# Patient Record
Sex: FEMALE | Race: WHITE | NOT HISPANIC OR LATINO | Employment: OTHER | RURAL
[De-identification: names, ages, dates, MRNs, and addresses within clinical notes are randomized per-mention and may not be internally consistent; named-entity substitution may affect disease eponyms.]

---

## 2020-11-16 ENCOUNTER — HISTORICAL (OUTPATIENT)
Dept: ADMINISTRATIVE | Facility: HOSPITAL | Age: 45
End: 2020-11-16

## 2020-11-17 LAB — SARS-COV+SARS-COV-2 AG RESP QL IA.RAPID: NEGATIVE

## 2021-12-13 DIAGNOSIS — M25.511 RIGHT SHOULDER PAIN: Primary | ICD-10-CM

## 2021-12-13 DIAGNOSIS — M25.561 RIGHT KNEE PAIN: ICD-10-CM

## 2022-01-05 ENCOUNTER — HOSPITAL ENCOUNTER (OUTPATIENT)
Dept: RADIOLOGY | Facility: HOSPITAL | Age: 47
Discharge: HOME | End: 2022-01-05
Attending: ORTHOPAEDIC SURGERY
Payer: MEDICARE

## 2022-01-05 ENCOUNTER — OFFICE VISIT (OUTPATIENT)
Dept: ORTHOPEDICS | Facility: CLINIC | Age: 47
End: 2022-01-05
Payer: MEDICARE

## 2022-01-05 VITALS — WEIGHT: 293 LBS | HEIGHT: 68 IN | BODY MASS INDEX: 44.41 KG/M2

## 2022-01-05 DIAGNOSIS — M25.511 ACUTE PAIN OF RIGHT SHOULDER: ICD-10-CM

## 2022-01-05 DIAGNOSIS — S42.254A CLOSED NONDISPLACED FRACTURE OF GREATER TUBEROSITY OF RIGHT HUMERUS, INITIAL ENCOUNTER: ICD-10-CM

## 2022-01-05 DIAGNOSIS — M25.511 RIGHT SHOULDER PAIN: ICD-10-CM

## 2022-01-05 DIAGNOSIS — M22.2X1 PATELLOFEMORAL PAIN SYNDROME OF RIGHT KNEE: Primary | ICD-10-CM

## 2022-01-05 DIAGNOSIS — M25.561 RIGHT KNEE PAIN: ICD-10-CM

## 2022-01-05 PROCEDURE — 99213 OFFICE O/P EST LOW 20 MIN: CPT | Mod: 25,,, | Performed by: NURSE PRACTITIONER

## 2022-01-05 PROCEDURE — 20610 DRAIN/INJ JOINT/BURSA W/O US: CPT | Mod: RT,,, | Performed by: NURSE PRACTITIONER

## 2022-01-05 PROCEDURE — 73030 X-RAY EXAM OF SHOULDER: CPT | Mod: 26,RT,, | Performed by: ORTHOPAEDIC SURGERY

## 2022-01-05 PROCEDURE — 99213 PR OFFICE/OUTPT VISIT, EST, LEVL III, 20-29 MIN: ICD-10-PCS | Mod: 25,,, | Performed by: NURSE PRACTITIONER

## 2022-01-05 PROCEDURE — 73564 X-RAY EXAM KNEE 4 OR MORE: CPT | Mod: 26,RT,, | Performed by: ORTHOPAEDIC SURGERY

## 2022-01-05 PROCEDURE — 73030 X-RAY EXAM OF SHOULDER: CPT | Mod: TC,RT

## 2022-01-05 PROCEDURE — 73030 XR SHOULDER COMPLETE 2 OR MORE VIEWS RIGHT: ICD-10-PCS | Mod: 26,RT,, | Performed by: ORTHOPAEDIC SURGERY

## 2022-01-05 PROCEDURE — 73564 X-RAY EXAM KNEE 4 OR MORE: CPT | Mod: TC,RT

## 2022-01-05 PROCEDURE — 20610 LARGE JOINT ASPIRATION/INJECTION: R PATELLAR BURSA: ICD-10-PCS | Mod: RT,,, | Performed by: NURSE PRACTITIONER

## 2022-01-05 PROCEDURE — 73564 XR KNEE COMP 4 OR MORE VIEWS RIGHT: ICD-10-PCS | Mod: 26,RT,, | Performed by: ORTHOPAEDIC SURGERY

## 2022-01-05 RX ORDER — MONTELUKAST SODIUM 10 MG/1
TABLET ORAL
COMMUNITY
Start: 2021-11-29

## 2022-01-05 RX ORDER — METOPROLOL SUCCINATE 25 MG/1
TABLET, EXTENDED RELEASE ORAL
COMMUNITY
Start: 2021-12-29

## 2022-01-05 RX ORDER — ARIPIPRAZOLE 20 MG/1
TABLET ORAL
COMMUNITY

## 2022-01-05 RX ORDER — TRAZODONE HYDROCHLORIDE 150 MG/1
TABLET ORAL
COMMUNITY

## 2022-01-05 RX ORDER — TRIAMCINOLONE ACETONIDE 40 MG/ML
40 INJECTION, SUSPENSION INTRA-ARTICULAR; INTRAMUSCULAR
Status: DISCONTINUED | OUTPATIENT
Start: 2022-01-05 | End: 2022-01-05 | Stop reason: HOSPADM

## 2022-01-05 RX ORDER — ERENUMAB-AOOE 70 MG/ML
INJECTION SUBCUTANEOUS
COMMUNITY
Start: 2021-11-29

## 2022-01-05 RX ORDER — TIZANIDINE 4 MG/1
TABLET ORAL
COMMUNITY

## 2022-01-05 RX ORDER — OXYCODONE AND ACETAMINOPHEN 10; 325 MG/1; MG/1
TABLET ORAL
COMMUNITY
Start: 2021-12-28

## 2022-01-05 RX ORDER — GABAPENTIN 300 MG/1
CAPSULE ORAL
COMMUNITY

## 2022-01-05 RX ORDER — PAROXETINE HYDROCHLORIDE 40 MG/1
TABLET, FILM COATED ORAL
COMMUNITY
Start: 2021-11-17

## 2022-01-05 RX ADMIN — TRIAMCINOLONE ACETONIDE 40 MG: 40 INJECTION, SUSPENSION INTRA-ARTICULAR; INTRAMUSCULAR at 10:01

## 2022-01-05 NOTE — PROGRESS NOTES
ASSESSMENT:      ICD-10-CM ICD-9-CM   1. Patellofemoral pain syndrome of right knee  M22.2X1 719.46   2. Acute pain of right shoulder  M25.511 719.41       PLAN:     -Findings and treatment options were discussed with the patient  -All questions answered  Natural history and expected course discussed. Questions answered.  Educational material distributed.  Reduction in offending activity.  OTC analgesics as needed.  Physical therapy referral.  Injection right knee  RTC 6 weeks    Patient Instructions   Will set up formal PT for right shoulder- PROM and AROM, and right knee  RTC 6 weeks      IMAGING:  X-ray Shoulder 2 or More Views Right  Healing nondisplaced greater tuberosity fracture    X-Ray Knee Complete 4 Or More Views Right    No acute fracture or dislocation          CC: Shoulder pain   46 y.o. Female who presents as a new patient to me for evaluation of right shoulder pain.   Pt fell into a chest injuring her shoulder a month ago, states she fell again on   causing more pain to her shoulder. Pt states she is also having knee pain, her knee feels weak at times like it wants to give way. Most of her pain is under her kneecap. She was seen at an outside clinic and told she has a proximal humerus fx.    Occupation: Disable  Pain has been present for several weeks.  Description of Injury:   she has not had formal physical therapy  she has not had previous shoulder injections.   she has not had advanced imaging such as MRI.   The shoulder pain worsens with activity and overhead motion. Pain is disruptive to sleep at night. The pain is better with rest. Treatment thus far has included rest and activity modification. Here today to discuss diagnosis and treatment options.   VAS Pain Scale:  7  SANE Score: 55         PAST MEDICAL HISTORY:   No past medical history on file.  PAST SURGICAL HISTORY:  Past Surgical History:   Procedure Laterality Date    BACK SURGERY       SECTION      GASTRIC  BYPASS      TONSILLECTOMY      TUBAL LIGATION       MEDICATIONS:    Current Outpatient Medications:     AIMOVIG AUTOINJECTOR 70 mg/mL autoinjector, , Disp: , Rfl:     ARIPiprazole (ABILIFY) 20 MG Tab, 1 tablet, Disp: , Rfl:     gabapentin (NEURONTIN) 300 MG capsule, 1 capsule at bedtime, Disp: , Rfl:     metoprolol succinate (TOPROL-XL) 25 MG 24 hr tablet, , Disp: , Rfl:     montelukast (SINGULAIR) 10 mg tablet, , Disp: , Rfl:     oxyCODONE-acetaminophen (PERCOCET)  mg per tablet, , Disp: , Rfl:     paroxetine (PAXIL) 40 MG tablet, , Disp: , Rfl:     tiZANidine (ZANAFLEX) 4 MG tablet, 1 capsule as needed, Disp: , Rfl:     traZODone (DESYREL) 150 MG tablet, 1 tablet at bedtime, Disp: , Rfl:   ALLERGIES:  Review of patient's allergies indicates:  No Known Allergies  REVIEW OF SYSTEMS:  Constitution: Negative. Negative for chills, fever and night sweats.    Hematologic/Lymphatic: Negative for bleeding problem. Does not bruise/bleed easily.   Skin: Negative for dry skin, itching and rash.   Musculoskeletal: Negative for falls. Positive for shoulder pain and muscle weakness.   All other review of symptoms were reviewed and found to be noncontributory.  PHYSICAL EXAMINATION:  General    Constitutional: She is oriented to person, place, and time. She appears well-developed and well-nourished. No distress.   HENT:   Head: Normocephalic and atraumatic.   Nose: Nose normal.   Eyes: EOM are normal. Pupils are equal, round, and reactive to light.   Neck: Neck supple.   Cardiovascular: Normal rate and intact distal pulses.    Pulmonary/Chest: Effort normal and breath sounds normal. No respiratory distress.   Abdominal: Soft. She exhibits no distension. There is no abdominal tenderness.   Neurological: She is alert and oriented to person, place, and time. She has normal reflexes. No cranial nerve deficit. She exhibits normal muscle tone. Coordination normal.   Psychiatric: She has a normal mood and affect. Her  behavior is normal. Judgment normal.           Right Knee Exam     Tenderness   The patient is tender to palpation of the lateral retinaculum and condyle.    Range of Motion   Extension: normal   Flexion: normal     Tests   Meniscus   Sully:  Medial - negative   Ligament Examination Lachman: normal (-1 to 2mm) Pivot Shift: normal (Equal)    Left Knee Exam     Inspection   Scars: absentRight Shoulder Exam     Inspection/Observation   Scars: absent  Deformity: absent  Scapular Winging: absent  Atrophy: absent    Tenderness   The patient is tender to palpation of the acromioclavicular joint and biceps tendon.    Range of Motion   Forward Flexion: normal   Forward Elevation: normal  Adduction: normal  External Rotation 0 degrees: normal   Internal rotation 0 degrees: normal   Internal rotation 90 degrees: normal     Tests & Signs   Drop arm: negative  Lag Sign 0 degrees: negative  Lag Sign 90 degrees: negative  Active Compression Test (Berkeley's Sign): positive  Yergason's Test: positive  Speed's Test: positive  Jerk Test: postive    Muscle Strength   Right Upper Extremity   Supraspinatus: 4/5   Subscapularis: 4/5   Right Lower Extremity   Hip Abduction: 5/5   Quadriceps:  5/5   Hamstrin/5           Orders Placed This Encounter   Procedures    Ambulatory referral/consult to Physical/Occupational Therapy     Standing Status:   Future     Standing Expiration Date:   2023     Referral Priority:   Routine     Referral Type:   Physical Medicine     Referral Reason:   Specialty Services Required     Number of Visits Requested:   1       Large Joint Aspiration/Injection: R patellar bursa    Date/Time: 2022 10:45 AM  Performed by: [place] Paul  Authorized by: [place] Paul     Consent Done?:  Yes (Verbal)  Indications:  Pain  Site marked: the procedure site was marked    Local anesthetic:  Bupivacaine 0.25% without epinephrine    Details:  Needle Size:  22 G  Location:  Knee  Site:  R patellar  bursa  Medications:  40 mg triamcinolone acetonide 40 mg/mL  Patient tolerance:  Patient tolerated the procedure well with no immediate complications

## 2022-02-10 ENCOUNTER — CLINICAL SUPPORT (OUTPATIENT)
Dept: REHABILITATION | Facility: HOSPITAL | Age: 47
End: 2022-02-10
Payer: MEDICARE

## 2022-02-10 DIAGNOSIS — M62.81 MUSCLE WEAKNESS OF RIGHT UPPER EXTREMITY: ICD-10-CM

## 2022-02-10 DIAGNOSIS — M62.81 QUADRICEPS WEAKNESS: ICD-10-CM

## 2022-02-10 DIAGNOSIS — M22.2X1 PATELLOFEMORAL PAIN SYNDROME OF RIGHT KNEE: ICD-10-CM

## 2022-02-10 DIAGNOSIS — R26.2 DIFFICULTY WALKING: ICD-10-CM

## 2022-02-10 DIAGNOSIS — M25.611 DECREASED ROM OF RIGHT SHOULDER: ICD-10-CM

## 2022-02-10 DIAGNOSIS — M25.511 ACUTE PAIN OF RIGHT SHOULDER: Primary | ICD-10-CM

## 2022-02-10 PROCEDURE — 97110 THERAPEUTIC EXERCISES: CPT

## 2022-02-10 PROCEDURE — 97161 PT EVAL LOW COMPLEX 20 MIN: CPT

## 2022-02-10 NOTE — PLAN OF CARE
[facility]   Physical Therapy Initial Evaluation    Name: Adeola Dennis  Clinic Number: [id number]    Therapy Diagnosis:   Encounter Diagnoses   Name Primary?    Patellofemoral pain syndrome of right knee     Acute pain of right shoulder      Physician: Kavita Che FNP    Physician Orders: PT Eval and Treat   Medical Diagnosis from Referral: right shoulder pain  Evaluation Date: 2/10/2022  Authorization Period Expiration: 2022  Plan of Care Expiration: 3/25/2022  Visit # / Visits authorized: 1/ no limits    Time In: 1008 am  Time Out: 1055 am  Total Appointment Time (timed & untimed codes): 47 minutes    Precautions: Standard and Fall    Subjective   Date of onset: 12/3/2021  History of current condition - APRIL reports: fell into her dresser and the tv fell on top of her on 12/3/2021 and then fell again on 2021 and caught herself with her arms - she says her right shoulder causes her the most pain and her right knee feels more weak versus painful - she says her balance is off - she also reports her father has Charcot Matilda Tooth disease and she is going to be tested for it as well due to her unexplained weakness and loss of balance     Medical History:   No past medical history on file.    Surgical History:   Adeola Dennis  has a past surgical history that includes  section; Gastric bypass; Tubal ligation; Tonsillectomy; and Back surgery.    Medications:   April has a current medication list which includes the following prescription(s): aimovig autoinjector, aripiprazole, gabapentin, metoprolol succinate, montelukast, oxycodone-acetaminophen, paroxetine, tizanidine, and trazodone.    Allergies:   Review of patient's allergies indicates:  No Known Allergies     Imaging, xrays right shoulder showed healing nondisplaced greater tuberosity fracture    Prior Therapy: none  Social History:  lives with their family  Occupation: on disability  Prior Level of Function: independent    Current Level of Function: independent     Pain:  Current 5/10, worst 8/10, best 3/10   Location: right shoulder   Description: Aching, Dull, Burning, Throbbing, Deep and Sharp  Aggravating Factors: reaching up overhead, behind head and out to the side  Easing Factors: pain medication and rest    Pts goals: get rid of shoulder pain and gain strength in her right knee    Objective     Posture: rounded shoulders yes, forward head yes, increased kyphotic curve yes, decreased lordotic curve no  Clear cervical spine: Spurling's test negative    Range of motion  Motion Right  Left    Shoulder flexion 143 degrees WITHIN FUNCTIONAL LIMITS   Shoulder extension WITHIN FUNCTIONAL LIMITS WITHIN FUNCTIONAL LIMITS   Shoulder abduction 85 degrees WITHIN FUNCTIONAL LIMITS   Shoulder internal rotation WITHIN FUNCTIONAL LIMITS WITHIN FUNCTIONAL LIMITS   Shoulder external rotation 32 degrees in adduction WITHIN FUNCTIONAL LIMITS   Elbow flexion WITHIN FUNCTIONAL LIMITS WITHIN FUNCTIONAL LIMITS   Elbow extension WITHIN FUNCTIONAL LIMITS WITHIN FUNCTIONAL LIMITS     Right knee range of motion: 0 degrees extension, 110 degrees flexion   Right knee strength: 4/5 flexion, 4/5 extension - able to do straight leg raise without lag on right lower extremity     MANUAL MUSCLE TEST  Muscle Right  Left    Shoulder flexion MMT strength: 3+/5 MMT strength: 5/5   Shoulder extension MMT strength: 3+/5 MMT strength: 5/5   Shoulder abduction MMT strength: 3/5 MMT strength: 5/5   Shoulder internal rotation MMT strength: 3+/5 MMT strength: 5/5   Shoulder external rotation MMT strength: 3/5 MMT strength: 5/5     Functional ability:  Dressing: AMB PT LEVEL OF ASSISTANCE: independent  Driving: AMB PT LEVEL OF ASSISTANCE: independent  Overhead activity: AMB PT LEVEL OF ASSISTANCE: mod A  Work/hobbies: AMB PT LEVEL OF ASSISTANCE: disabled      Clinical test:   Apprehension test: negative   Neer's test: positive   Drop arm test: negative   Empty can  test: positive   Hawkin's curt test: positive      Patient has a notable trunk sway upon immediate standing - it does not seem to be caused from right knee buckling - she does have some right lower extremity weakness but also has some decreased balance and proprioception that does not seem to be related to that - she says she has lower extremity neuropathy - she is also being tested for Charcot Matilda Tooth disease      Limitation/Restriction for FOTO Shoulder Survey    Therapist reviewed FOTO scores for Adeola Dennis on 2/10/2022.   FOTO documents entered into [employer] - see Media section.    Intake Score: 45         TREATMENT     Total Treatment time (time-based codes) separate from Evaluation: 10 minutes    APRIL received the treatments listed below:  THERAPEUTIC EXERCISES to develop strength, ROM and flexibility for 10 minutes including counter flexion slides, seated scapular retraction, bilateral external rotation with theraband, LAQ, SLR, hamstring stretch    Home Exercises and Patient Education Provided    Education provided:   - evaluation results, plan of care and home exercise program     Written Home Exercises Provided: yes.  Exercises were reviewed and APRIL was able to demonstrate them prior to the end of the session.  APRIL demonstrated good  understanding of the education provided.     See EMR under Patient Instructions for exercises provided 2/10/2022.    Assessment   April is a 46 y.o. female referred to outpatient Physical Therapy with a medical diagnosis of right shoulder and knee pain. Pt presents with right shoulder pain and right knee weakness. She fell and sustained a greater tuberosity fracture of the right humerus that is now healing. She did not undergo surgery. She has some decreased range of motion and strength in right shoulder. She also has pain with reaching overhead, behind head and out to the side. Her knee feels more weak than painful. She says she has noticed a decrease in her  balance. She feels unsteady when she first stands and has a notable trunk sway. Patient says her shoulder pain is what bothers her the most so we will focus on that right now in the clinic and she has been given some home exercises for knee strengthening. She also reports she is being tested for Charcot Matilda Tooth disease secondary to her onset of weakness and decreased balance as well as the fact that her father has it.     Pt prognosis is Good.   Pt will benefit from skilled outpatient Physical Therapy to address the deficits stated above and in the chart below, provide pt/family education, and to maximize pt's level of independence.     Plan of care discussed with patient: Yes  Pt's spiritual, cultural and educational needs considered and patient is agreeable to the plan of care and goals as stated below:     Anticipated Barriers for therapy: none    Medical Necessity is demonstrated by the following  History  Co-morbidities and personal factors that may impact the plan of care Co-morbidities:   see PMH above    Personal Factors:   no deficits     low   Examination  Body Structures and Functions, activity limitations and participation restrictions that may impact the plan of care Body Regions:   lower extremities  upper extremities    Body Systems:    gross symmetry  ROM  strength  gross coordinated movement  balance  gait  transfers  motor control    Participation Restrictions:   none    Activity limitations:   Learning and applying knowledge  no deficits    General Tasks and Commands  no deficits    Communication  no deficits    Mobility  lifting and carrying objects  walking    Self care  no deficits    Domestic Life  doing house work (cleaning house, washing dishes, laundry)    Interactions/Relationships  no deficits    Life Areas  no deficits    Community and Social Life  no deficits         low   Clinical Presentation stable and uncomplicated low   Decision Making/ Complexity Score:      Goals:  Short Term  Goals: 3 weeks  1. Patient will be independent with home exercise program.  2. Patient will be independent with dressing and driving.  3. Patient will be able to sleep all night in bed.    Long Term Goals: 6 weeks  1. Patient will have active range of motion as follow - 150 degrees flexion and 60 degrees external rotation for reaching overhead and behind head for dressing, grooming and hygiene.  2. Patient will have 4+ to 5/5 strength right shoulder/upper extremity to perform household chores and work related tasks.  3. Patient will place 3# dumbbell on head height shelf without hiking or pain right shoulder.  4. Patient will have 4+ to 5/5 strength right lower extremity to improve stability with gait and activiites of daily living.  5. Patient will be able to maintain single leg stance on right lower extremity at least 10 seconds without LOB.      Plan   Plan of care Certification: 2/10/2022 to 3/25/2022.    Outpatient Physical Therapy 2 times weekly for 6 weeks to include the following interventions: Electrical Stimulation NMES, Gait Training, Iontophoresis (with 2 mL Dexamethasone), Manual Therapy, Moist Heat/ Ice, Neuromuscular Re-ed, Patient Education, Therapeutic Activities, Therapeutic Exercise and Ultrasound.     SUGEY WOODARD, PT

## 2022-02-14 PROBLEM — M62.81 QUADRICEPS WEAKNESS: Status: ACTIVE | Noted: 2022-02-14

## 2022-02-14 PROBLEM — M22.2X1 PATELLOFEMORAL PAIN SYNDROME OF RIGHT KNEE: Status: ACTIVE | Noted: 2022-02-14

## 2022-02-14 PROBLEM — R26.2 DIFFICULTY WALKING: Status: ACTIVE | Noted: 2022-02-14

## 2022-02-14 PROBLEM — M62.81 MUSCLE WEAKNESS OF RIGHT UPPER EXTREMITY: Status: ACTIVE | Noted: 2022-02-14

## 2022-02-14 PROBLEM — M25.611 DECREASED ROM OF RIGHT SHOULDER: Status: ACTIVE | Noted: 2022-02-14

## 2022-02-14 PROBLEM — M25.511 ACUTE PAIN OF RIGHT SHOULDER: Status: ACTIVE | Noted: 2022-02-14

## 2022-03-24 DIAGNOSIS — M22.2X1 PATELLOFEMORAL PAIN SYNDROME OF RIGHT KNEE: Primary | ICD-10-CM
